# Patient Record
Sex: FEMALE | Race: WHITE | Employment: UNEMPLOYED | URBAN - METROPOLITAN AREA
[De-identification: names, ages, dates, MRNs, and addresses within clinical notes are randomized per-mention and may not be internally consistent; named-entity substitution may affect disease eponyms.]

---

## 2024-04-08 ENCOUNTER — OFFICE VISIT (OUTPATIENT)
Dept: ENDOCRINOLOGY | Facility: CLINIC | Age: 29
End: 2024-04-08

## 2024-04-08 DIAGNOSIS — E10.65 TYPE 1 DIABETES MELLITUS WITH HYPERGLYCEMIA (HCC): Primary | ICD-10-CM

## 2024-04-08 DIAGNOSIS — Z3A.17 17 WEEKS GESTATION OF PREGNANCY: ICD-10-CM

## 2024-04-08 PROCEDURE — 95251 CONT GLUC MNTR ANALYSIS I&R: CPT | Performed by: INTERNAL MEDICINE

## 2024-04-08 PROCEDURE — 99204 OFFICE O/P NEW MOD 45 MIN: CPT | Performed by: INTERNAL MEDICINE

## 2024-04-08 RX ORDER — INSULIN ASPART 100 [IU]/ML
INJECTION, SOLUTION INTRAVENOUS; SUBCUTANEOUS
COMMUNITY

## 2024-04-08 NOTE — PROGRESS NOTES
Cheryl Schulte 38 y.o. female MRN: [id number]    Encounter: [id number]      Assessment/Plan     Assessment:  This is a 38 y.o.-year-old female with diabetes with hyperglycemia.    Plan:  Diagnoses and all orders for this visit:    Type 1 diabetes mellitus with hyperglycemia (HCC)    Patient is new to our system  She has moved from [place] and going back to [facility] next month  Patient did not have any blood work recently.  Will obtain A1c I have also ordered thyroid profile as well as basic metabolic profile  As per continuous glucose monitor sensor download, blood sugars are elevated usually after breakfast lunch and after dinner, discussed with patient it is very important to take food bolus, calculate appropriate carbs and enter in bolus wizard to get appropriate dose of insulin and to avoid fluctuation in blood sugars.  Will change insulin to carb ratio to 1 unit for 8 g, continue sensitivity factor to 45  Patient is pregnant we will refer her to maternal-fetal medicine  Discussed to upload the sensor in 1 to 2 weeks to make further adjustment, because the goal for blood sugar is 80 to 140 mg per DL.  Educated about hypoglycemia symptoms and treatment  Discussed the effect of hyperglycemia on fetus as well as out, pregnancy  Discussed  the importance of follow-up with ophthalmology  -     Ambulatory referral to Diabetic Education; Future  -     Ambulatory Referral to Maternal Fetal Medicine; Future  -     TSH, 3rd generation; Future  -     T4, free; Future  -     Thyroid Antibodies Panel; Future  -     Hemoglobin A1C; Future  -     Basic metabolic panel; Future    17 weeks gestation of pregnancy  Currently followed by OB/GYN.  Will refer to maternal-fetal medicine  Other orders  -     Insulin Aspart (NovoLOG) 100 units/mL injection; Inject under the skin  -     Prenatal MV-Min-Fe Fum-FA-DHA (PRENATAL 1 PO); Take by mouth in the morning         CC: Diabetes    History of Present Illness     HPI:    Cheryl Schulte   is 38 yr old who is 18 weeks pregnant, with history of type 1 diabetes managed on OmniPod 5 insulin pump.  [place] her  is working in [place] and that is why she made this appointment, before she was pregnant  She is going back to her home state next month      Patient is using OmniPod 5 insulin pump  Basal rates  12 AM-0.75 units/h  24-hour total daily dose is 18 units    Sensitivity, 12 AM-45 mg per DL    Close correction threshold  12 AM-110 mg per DL  8 AM-130 mg per DL  9  mg per DL    Blood sugar target range, 110 mg per DL    Insulin to carb ratio  12 AM- 10 gm     She also uses continuous glucose monitor sensor but Dexcom G6.  2 weeks ago review from March 26, 2024 to April 8, 2024 reviewed  More than 72 hours of data reviewed  Average glucose is 177 mg per DL  Standard deviation is 66 mg per DL, GMI 7.5%  57% blood sugars are in target range  Less than 1% blood sugars are low or very low  4% blood sugars are high  18% blood sugars are very high    Patient has pattern of elevated blood sugars from 11 AM to 12 AM, especially after meals  Sometimes she is not taking food boluses       Review of Systems   Constitutional:  Negative for activity change, diaphoresis, fatigue, fever and unexpected weight change.   HENT: Negative.     Eyes:  Negative for visual disturbance.   Respiratory:  Negative for cough, chest tightness and shortness of breath.    Cardiovascular:  Negative for chest pain, palpitations and leg swelling.   Gastrointestinal:  Negative for abdominal pain, blood in stool, constipation, diarrhea, nausea and vomiting.   Endocrine: Negative for cold intolerance, heat intolerance, polydipsia, polyphagia and polyuria.   Genitourinary:  Negative for dysuria, enuresis, frequency and urgency.   Musculoskeletal:  Negative for arthralgias and myalgias.   Skin:  Negative for pallor, rash and wound.   Allergic/Immunologic: Negative.    Neurological:  Negative for  dizziness, tremors, weakness and numbness.   Hematological: Negative.    Psychiatric/Behavioral: Negative.         Historical Information   Past Medical History:   Diagnosis Date    Depression     Type 1 diabetes (HCC) 2018     Past Surgical History:   Procedure Laterality Date    WISDOM TOOTH EXTRACTION       Social History   Social History     Substance and Sexual Activity   Alcohol Use Not Currently     Social History     Substance and Sexual Activity   Drug Use Not on file     Social History     Tobacco Use   Smoking Status Never   Smokeless Tobacco Never     Family History:   Family History   Problem Relation Age of Onset    No Known Problems Mother     No Known Problems Father     Diabetes type II Maternal Grandmother        Meds/Allergies   Current Outpatient Medications   Medication Sig Dispense Refill    Insulin Aspart (NovoLOG) 100 units/mL injection Inject under the skin      Prenatal MV-Min-Fe Fum-FA-DHA (PRENATAL 1 PO) Take by mouth in the morning       No current facility-administered medications for this visit.     Allergies   Allergen Reactions    Lantus [Insulin Glargine] Hives       Objective   Vitals: Blood pressure 120/82, pulse 103, weight 77.6 kg (171 lb), SpO2 98%.    Physical Exam  Vitals reviewed.   Constitutional:       General: She is not in acute distress.     Appearance: Normal appearance. She is not ill-appearing.   HENT:      Head: Normocephalic and atraumatic.      Nose: Nose normal.   Eyes:      Extraocular Movements: Extraocular movements intact.      Conjunctiva/sclera: Conjunctivae normal.   Pulmonary:      Effort: No respiratory distress.   Musculoskeletal:      Cervical back: Normal range of motion.   Neurological:      General: No focal deficit present.      Mental Status: She is alert and oriented to person, place, and time.   Psychiatric:         Mood and Affect: Mood normal.         Behavior: Behavior normal.         The history was obtained from the review of the chart,  "patient.    Lab Results:            Imaging Studies: I have personally reviewed pertinent reports.      Portions of the record may have been created with voice recognition software. Occasional wrong word or \"sound a like\" substitutions may have occurred due to the inherent limitations of voice recognition software. Read the chart carefully and recognize, using context, where substitutions have occurred.    "

## 2024-04-09 ENCOUNTER — TELEPHONE (OUTPATIENT)
Age: 29
End: 2024-04-09

## 2024-04-09 NOTE — TELEPHONE ENCOUNTER
Received follow up from endo. Patient is 18 wks pregnant with type I diabetes. She has a referral from the endo. However, patient needs to establish care with an OB/GYN. We can schedule her from Endo referral. Recommend an OB office to her if she does not have one. Please document the conversation with her if she does not establish care with an ob. She needs to be scheduled for a Nuchal and Diabetic counseling.

## 2024-04-10 VITALS
WEIGHT: 171 LBS | OXYGEN SATURATION: 98 % | SYSTOLIC BLOOD PRESSURE: 120 MMHG | HEART RATE: 103 BPM | DIASTOLIC BLOOD PRESSURE: 82 MMHG

## 2024-04-11 ENCOUNTER — TELEPHONE (OUTPATIENT)
Age: 29
End: 2024-04-11

## 2024-04-12 ENCOUNTER — TELEPHONE (OUTPATIENT)
Age: 29
End: 2024-04-12

## 2024-04-15 ENCOUNTER — TELEPHONE (OUTPATIENT)
Age: 29
End: 2024-04-15